# Patient Record
Sex: FEMALE
[De-identification: names, ages, dates, MRNs, and addresses within clinical notes are randomized per-mention and may not be internally consistent; named-entity substitution may affect disease eponyms.]

---
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
## 2023-01-03 ENCOUNTER — APPOINTMENT (OUTPATIENT)
Dept: PSYCHIATRY | Facility: CLINIC | Age: 23
End: 2023-01-03
Payer: COMMERCIAL

## 2023-01-03 PROCEDURE — 99214 OFFICE O/P EST MOD 30 MIN: CPT

## 2023-01-03 RX ORDER — ESCITALOPRAM OXALATE 5 MG/1
5 TABLET ORAL DAILY
Qty: 30 | Refills: 0 | Status: COMPLETED | COMMUNITY
Start: 2021-08-16 | End: 2023-01-03

## 2023-01-03 NOTE — DISCUSSION/SUMMARY
[FreeTextEntry1] : Assessment: Patient is a 22 yo  female with h/o anxiety seen today for medication management. Patient is compliant with her medication, tolerating it well without any side effects. I-stop reviewed and no issues noticed.\par \par PLAN:\par Re-start Lexapro 10 mg PO QAM fro depression and anxiety\par Continue Klonopin 0.5 mg PO PRN for anxiety. #15\par Continue Trazodone 50 mg PO QHS for insomnia \par - Discussed risks and benefits of medications including side effects of GI and sexual side effects with SSRI. Alternative strategies including no intervention discussed with patient. Patient consents to current medications as prescribed.\par - Discussed with patient regarding importance of abstinence and sobriety from alcohol and drugs. Educated about relationship between worsening mood/anxiety symptoms and drug use and improvement of symptoms with abstinence. \par - Discussed about unpredictable effects including cardiorespiratory collapse from the combination of illicit drugs and prescribed medications. Patient verbalized understanding.\par - Patient understands to contact clinic prn with concerns and agrees to call 911 or go to nearest ER if symptoms worsen.\par - Next appointment made in 1 month. Patient left the office without any distress.\par

## 2023-01-03 NOTE — CURRENT PSYCHIATRIC SYMPTOMS
[Depressed Mood] : depressed mood [Decreased Concentration] : decreased concentrating ability [Insomnia] : insomnia [Psychomotor Agitation] : psychomotor agitation [Anorexia] : anorexia [Euphoria] : euphoria [Highly Irritable] : high irritability [Increased Activity] : increased activity [Talkativeness] : talkativeness [Buying Sprees] : buying sprees [Dec Need For Sleep] : decreased need for sleep [Excessive Worry] : excessive worry [Hypochondriasis] : hypochondriasis [Panic] : panic  [de-identified] : improved [de-identified] : improved [de-identified] : denies [de-identified] : improved [de-identified] : denies [de-identified] : denies [de-identified] : denies

## 2023-01-03 NOTE — FAMILY HISTORY
[FreeTextEntry1] : Maternal brother:  by substance use\par Mother: anxiety. No meds\par Brother: ADHD, depression and anxiety\par

## 2023-01-03 NOTE — PHYSICAL EXAM
[None] : none [Anxious] : anxious [Normal] : good [FreeTextEntry8] : "I'm anxious." improved [FreeTextEntry9] : improved

## 2023-01-03 NOTE — SOCIAL HISTORY
[With Family] : lives with family [Employed] : employed [Never ] : never  [College] : College [None] : none [FreeTextEntry1] : Born: [place], [place]\par Siblings: 1 brother (17)\par Parents: Birth father passed away when pt was 17. Were estranged. 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 No boyfriend\par Abuse denies\par Legal: Denies\par \par

## 2023-01-03 NOTE — HISTORY OF PRESENT ILLNESS
[de-identified] : \par Patient is here in the office for face to face interview with writer for follow-up visit.\par \par Writer has not seen the patient since Aug 2021. At that time Lexapro was increased form 10 to 15 mg. Patient states she got off the Lexapro 1 year ago. States her mom did not want her to be on it and seemed like she was doing well she she stopped taking it. States she has been getting panic attack 3 times a week. Has been taking Klonopin but only as needed. \par \par Mood: anxious, irritable, and depressed. \par Sleep: 7 hours no Trazodone\par Appetite: OK\par Energy, and motivation are decreased\par Concentration is decreased. Denies any AVH, SI or HI. \par

## 2023-01-03 NOTE — PAST MEDICAL HISTORY
[FreeTextEntry1] : \par H/o anxiety \par \par Denies any inpatient hospitalization admission \par \par Past SI attempts: denies\par \par Therapy: denies\par \par Psychiatrist: denies\par \par Medication trials: Last Sunday she was having a panic like symptoms and a family friend gave her a Klonopin 0.5 mg. She took half of a 0.25 mg and astates she felt better on it. States she took the half of a 0.25 mg 3 times\par \par Current medications: none\par \par Firearms: denies\par Medical: denies\par

## 2023-02-22 ENCOUNTER — TRANSCRIPTION ENCOUNTER (OUTPATIENT)
Age: 23
End: 2023-02-22

## 2023-02-22 ENCOUNTER — APPOINTMENT (OUTPATIENT)
Dept: PSYCHIATRY | Facility: CLINIC | Age: 23
End: 2023-02-22
Payer: COMMERCIAL

## 2023-02-22 PROCEDURE — 99214 OFFICE O/P EST MOD 30 MIN: CPT | Mod: 95

## 2023-02-22 NOTE — SOCIAL HISTORY
[With Family] : lives with family [Employed] : employed [Never ] : never  [College] : College [None] : none [FreeTextEntry1] : Born: [place], [place]\par Siblings: 1 brother (17)\par Parents: Birth father passed away when pt was 17. Were estranged. 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 No boyfriend\par Abuse denies\par Legal: Denies\par \par

## 2023-02-22 NOTE — DISCUSSION/SUMMARY
[FreeTextEntry1] : Assessment: Patient is a 22 yo  female with h/o anxiety seen today for medication management. Patient is compliant with her medication, tolerating it well without any side effects. I-stop reviewed and no issues noticed.\par \par PLAN:\par Continue Lexapro 10 mg PO QAM fro depression and anxiety\par Continue Klonopin 0.5 mg PO PRN for anxiety. #15\par D/C Trazodone 50 mg PO QHS for insomnia \par - Discussed risks and benefits of medications including side effects of GI and sexual side effects with SSRI. Alternative strategies including no intervention discussed with patient. Patient consents to current medications as prescribed.\par - Discussed with patient regarding importance of abstinence and sobriety from alcohol and drugs. Educated about relationship between worsening mood/anxiety symptoms and drug use and improvement of symptoms with abstinence. \par - Discussed about unpredictable effects including cardiorespiratory collapse from the combination of illicit drugs and prescribed medications. Patient verbalized understanding.\par - Patient understands to contact clinic prn with concerns and agrees to call 911 or go to nearest ER if symptoms worsen.\par - Next appointment made in 1 month. Patient left the office without any distress.\par

## 2023-02-22 NOTE — HISTORY OF PRESENT ILLNESS
[de-identified] : \par Patient is seen for the 1 month follow-up visit via telehealth\par \par Last month Lexapro 10 mg was restarted on the patient. Patient states the first 2-3 weeks she was experiencing nausea, increase anxiety, panic attacks, irritability, and felt tired. Nausea is now better but states "I know it is going to happen." States she took four quarters of a 0.25 of a Klonopin. She does not take the whole pill. \par Sleep: 6-7 hours per night. \par Appetite, energy, concentration and motivation are good. \par Denies any AVH, SI or HI.

## 2023-04-17 ENCOUNTER — APPOINTMENT (OUTPATIENT)
Dept: PSYCHIATRY | Facility: CLINIC | Age: 23
End: 2023-04-17
Payer: COMMERCIAL

## 2023-04-17 PROCEDURE — 99214 OFFICE O/P EST MOD 30 MIN: CPT | Mod: 95

## 2023-04-17 NOTE — SOCIAL HISTORY
[With Family] : lives with family [Employed] : employed [Never ] : never  [College] : College [None] : none [FreeTextEntry1] : Born: [place], [place]\par Siblings: 1 brother (17)\par Parents: Birth father passed away when pt was 17. Were estranged. 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 No boyfriend\par Abuse denies\par Legal: Denies\par \par

## 2023-04-17 NOTE — HISTORY OF PRESENT ILLNESS
[Home] : at home, [unfilled] , at the time of the visit. [Medical Office: ([facility])___] : at the medical office located in  [Verbal consent obtained from patient] : the patient, [unfilled] [de-identified] : \par Patient is seen for the 1 month follow-up visit via telehealth\par \par Mood: less depression and less anxiety.  Less panic attacks, irritability, and feeling tired. Nausea is now better. No Klonopin this month. \par Sleep: 7 hours per night. \par Appetite, energy, concentration and motivation are good. \par Denies any AVH, SI or HI.

## 2023-09-05 ENCOUNTER — APPOINTMENT (OUTPATIENT)
Dept: PSYCHIATRY | Facility: CLINIC | Age: 23
End: 2023-09-05
Payer: COMMERCIAL

## 2023-09-05 PROCEDURE — 99214 OFFICE O/P EST MOD 30 MIN: CPT | Mod: 95

## 2023-09-05 NOTE — DISCUSSION/SUMMARY
[FreeTextEntry1] : Assessment: Patient is a 22 yo  female with h/o anxiety seen today for medication management. Patient is compliant with her medication, tolerating it well without any side effects. I-stop reviewed and no issues noticed.  PLAN: Continue Lexapro 10 mg PO QAM fro depression and anxiety Continue Klonopin 0.5 mg PO PRN for anxiety. #15 D/C Trazodone 50 mg PO QHS for insomnia  - Discussed risks and benefits of medications including side effects of GI and sexual side effects with SSRI. Alternative strategies including no intervention discussed with patient. Patient consents to current medications as prescribed. - Discussed with patient regarding importance of abstinence and sobriety from alcohol and drugs. Educated about relationship between worsening mood/anxiety symptoms and drug use and improvement of symptoms with abstinence.  - Discussed about unpredictable effects including cardiorespiratory collapse from the combination of illicit drugs and prescribed medications. Patient verbalized understanding. - Patient understands to contact clinic prn with concerns and agrees to call 911 or go to nearest ER if symptoms worsen. - Next appointment made in 6 month. Patient was not in any distress.

## 2023-09-05 NOTE — HISTORY OF PRESENT ILLNESS
[Home] : at home, [unfilled] , at the time of the visit. [Medical Office: ([facility])___] : at the medical office located in  [Verbal consent obtained from patient] : the patient, [unfilled] [de-identified] : The following service was provided using telehealth between writer/provider and patient. The patient was at home. The writer was at clinic. Patient was alone and consented to telehealth format. All treatment plans through and including today's date were reviewed with the patient.  Patient is seen for the 3-month follow-up visit via telehealth.  Last seen in April 2023. No medication changes at that time. States she is doing well.   Mood: less depression and less anxiety.  Less panic attacks, irritability, and feeling tired. Nausea is now better. Sleep: 7 hours per night. No Trazodone.  Appetite, energy, concentration and motivation are good.  Denies any AVH, SI or HI.

## 2023-09-05 NOTE — SOCIAL HISTORY
[With Family] : lives with family [Employed] : employed [Never ] : never  [College] : College [None] : none [FreeTextEntry1] : Born: [place], [place]\par  Siblings: 1 brother (17)\par  Parents: Birth father passed away when pt was 17. Were estranged. 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 No boyfriend\par  Abuse denies\par  Legal: Denies\par  \par

## 2024-01-31 ENCOUNTER — APPOINTMENT (OUTPATIENT)
Dept: PSYCHIATRY | Facility: CLINIC | Age: 24
End: 2024-01-31
Payer: COMMERCIAL

## 2024-01-31 PROCEDURE — 99214 OFFICE O/P EST MOD 30 MIN: CPT | Mod: 95

## 2024-01-31 RX ORDER — TRAZODONE HYDROCHLORIDE 50 MG/1
50 TABLET ORAL
Qty: 30 | Refills: 0 | Status: COMPLETED | COMMUNITY
Start: 2021-07-19 | End: 2024-01-31

## 2024-01-31 RX ORDER — ESCITALOPRAM OXALATE 10 MG/1
10 TABLET ORAL
Qty: 90 | Refills: 1 | Status: ACTIVE | COMMUNITY
Start: 2021-07-19 | End: 1900-01-01

## 2024-01-31 RX ORDER — CLONAZEPAM 0.5 MG/1
0.5 TABLET ORAL DAILY
Qty: 15 | Refills: 0 | Status: COMPLETED | COMMUNITY
Start: 2021-07-19 | End: 2024-01-31

## 2024-01-31 NOTE — HISTORY OF PRESENT ILLNESS
[Home] : at home, [unfilled] , at the time of the visit. [Medical Office: ([facility])___] : at the medical office located in  [Verbal consent obtained from patient] : the patient, [unfilled] [de-identified] : The following service was provided using telehealth between writer/provider and patient. The patient was at home. The writer was at clinic. Patient was alone and consented to telehealth format. All treatment plans through and including today's date were reviewed with the patient.  Patient is seen for the 6-month follow-up visit via telehealth.  Last seen in Sept 2023. No medication changes at that time. States she is doing well.   Mood: less depression and less anxiety.  Less panic attacks, irritability, and feeling tired. Nausea is now better. Wanted to try disintegrated Klonopin instead of the tablets.  Sleep: 7 hours per night. No Trazodone.  Appetite, energy, concentration and motivation are good.  Denies any AVH, SI or HI.

## 2024-01-31 NOTE — DISCUSSION/SUMMARY
[FreeTextEntry1] : Assessment: Patient is a 20 yo  female with h/o anxiety seen today for medication management. Patient is compliant with her medication, tolerating it well without any side effects. I-stop reviewed and no issues noticed.  PLAN: Continue Lexapro 10 mg PO QAM fro depression and anxiety Continue Klonopin 0.5 mg PO PRN for anxiety. #10. Disintegrated form.  D/C Trazodone 50 mg PO QHS for insomnia  - Discussed risks and benefits of medications including side effects of GI and sexual side effects with SSRI. Alternative strategies including no intervention discussed with patient. Patient consents to current medications as prescribed. - Discussed with patient regarding importance of abstinence and sobriety from alcohol and drugs. Educated about relationship between worsening mood/anxiety symptoms and drug use and improvement of symptoms with abstinence.  - Discussed about unpredictable effects including cardiorespiratory collapse from the combination of illicit drugs and prescribed medications. Patient verbalized understanding. - Patient understands to contact clinic prn with concerns and agrees to call 911 or go to nearest ER if symptoms worsen. - Next appointment made in 6 month. Patient was not in any distress.

## 2024-01-31 NOTE — SOCIAL HISTORY
[With Family] : lives with family [Employed] : employed [Never ] : never  [College] : College [None] : none [FreeTextEntry1] : Born: [place], [place]\par  Siblings: 1 brother (17)\par  Parents: Birth father passed away when pt was 17. Were estranged. 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 No boyfriend\par  Abuse denies\par  Legal: Denies\par  \par

## 2024-04-18 RX ORDER — CLONAZEPAM 0.25 MG/1
0.25 TABLET, ORALLY DISINTEGRATING ORAL DAILY
Qty: 10 | Refills: 0 | Status: ACTIVE | COMMUNITY
Start: 2024-01-31 | End: 1900-01-01

## 2024-12-10 ENCOUNTER — APPOINTMENT (OUTPATIENT)
Dept: PSYCHIATRY | Facility: CLINIC | Age: 24
End: 2024-12-10
Payer: COMMERCIAL

## 2024-12-10 PROCEDURE — 99214 OFFICE O/P EST MOD 30 MIN: CPT | Mod: 95

## 2025-04-14 ENCOUNTER — APPOINTMENT (OUTPATIENT)
Dept: PSYCHIATRY | Facility: CLINIC | Age: 25
End: 2025-04-14

## 2025-05-05 ENCOUNTER — APPOINTMENT (OUTPATIENT)
Dept: PSYCHIATRY | Facility: CLINIC | Age: 25
End: 2025-05-05
Payer: COMMERCIAL

## 2025-05-05 PROCEDURE — 99214 OFFICE O/P EST MOD 30 MIN: CPT | Mod: 95
